# Patient Record
(demographics unavailable — no encounter records)

---

## 2024-11-20 NOTE — REASON FOR VISIT
[de-identified] : robotic left adrenalectomy  [de-identified] : 10/31/24 [de-identified] : 15 [de-identified] : 3

## 2024-11-20 NOTE — ASSESSMENT
[FreeTextEntry1] : Ms. CRUZ FAJARDO is a 46-year-old female presenting for postoperative evaluation s/p robotic left adrenalectomy 10/31/2024. She is followed by  Dr. Primitivo thomas.  S/p D&C 10/14/24. She was initially found to have a complex adrenal mass with small internal foci of fat attenuation on a CT abdomen and pelvis on 12/10/2022.   Patient s/p robotic left adrenalectomy performed 10/31/2024. Final pathology pending. She had some serosanguinous drainage at the laparoscopy site without concern.   Patient presents for postoperative evaluation. We will follow with her on the final pathology, however discussed I do not anticipate this will be a cancer. Her lap sites appear well-healing on examination and she is recovering appropriately. Will follow up on pathology. Call the office if condition changes.   After visit addendum: Pathology resulted: Adrenal gland, left, mass, adrenalectomy- Adrenal cortical adenoma with cystic changes, hemorrhage and myelolipomatous changes, findings are compatible with an adrenal cortical adenoma. Findings were relayed to the patient. Follow up PRN.

## 2024-11-20 NOTE — REASON FOR VISIT
[de-identified] : robotic left adrenalectomy  [de-identified] : 10/31/24 [de-identified] : 15 [de-identified] : 3

## 2024-11-20 NOTE — PHYSICAL EXAM
[TextEntry] : General: No acute distress. Well nourished and well kept. Head: AT/NC Eyes: PERRL. EOMI. Pulmonary: No respiratory distress. Abdomen: Soft. NT/ND. No rebound, no guarding, no rigidity. No peritoneal signs. No masses. Incision c/d/i - non-tender to palpation, no erythema or drainage at lap sites. EPigastric lap site scabbed over. Dressings removed and steristrips intact on lateral lap sites. Neurological: A&O x 4. Psychiatric: Normal affect and mood.

## 2024-11-20 NOTE — HISTORY OF PRESENT ILLNESS
[FreeTextEntry1] : Ms. CRUZ FAJARDO is a 46-year-old female presenting for postoperative follow-up s/p robotic left adrenalectomy performed 10/31/2024. She has a past medical history of diabetes mellitus, hypertension, obstructive sleep apnea on CPAP, and morbid obesity. Her surgical history is significant for a gastric bypass and an exploratory laparoscopy. She is followed by Dr. Rutledge with bariatric surgery, Dr. Primitivo thomas.  She was found to have a complex adrenal mass with small internal foci of fat attenuation on a CT abdomen and pelvis on 12/10/2022. A subsequent MRI of the abdomen was performed on 1/28/2023 which showed a solid, cystic fat containing mass within the left adrenal gland measuring up to 6.3 cm. MRI features were in favor of either a lipid rich adrenal adenoma with cystic degeneration or a lipid-poor adrenal myelolipoma with cystic degeneration.  At her initial consultation 07/07/2023 I discussed that given the size she will require a left adrenalectomy, which can likely be done minimally invasive. Will need endocrine workup prior - she established care with martha Langford 9/25/2023. She was delayed in following up with martha. On recent endo workup 7/2024, plasma metanephrines, aldosterone & renin were WNL. Cortisol on dexamethasone suppression was not completely suppressed (even on repeat testing) with low ACTH + SONALI suggesting some degree of autonomous cortisol production- likely mild autonomous cortisol secretion. She has no clinical features concerning for overt cushing's and normal salivary cortisol and 24hr urinary cortisol. Given this finding, patient may need low dose steroid replacement postoperatively. She was also evaluated by GYN underwent D&C 10/14/24 for abnormal uterine bleeding and fibroids. She was also prescribed norethindrone.  Preoperative A/P CT 08/09/2024 demonstrated the complex cystic left adrenal mass had increased in size from prior with peripheral rind of heterogeneous enhancing soft tissue, bulk fat, and curvilinear calcifications. Cystic component increased to 3.6 x 4.6 x 3.6 cm, previously 3.6 x 4.0 x 3.9 cm. Entirety lesion now measures 5.8 x 7.1 x 5.5 cm, previously 5.3 x 6.2 x 5.1 cm.  Patient s/p robotic left adrenalectomy performed 10/31/2024. Final pathology pending. She had some serosanguinous drainage at the laparoscopy site without concern.   Patient presents for postoperative evaluation. Feels well overall.

## 2024-11-20 NOTE — END OF VISIT
[FreeTextEntry3] : By signing my name below, I, Meurisergei Castro, attest that this documentation has been prepared under the direction and in the presence of Sreekanth Villanueva MD in the following sections HISTORY OF PRESENT ILLNESS; PAST MEDICAL/FAMILY/SOCIAL HISTORY; REVIEW OF SYSTEMS; PHYSICAL EXAM; ASSESSMENT/PLAN.

## 2024-11-20 NOTE — REASON FOR VISIT
[de-identified] : robotic left adrenalectomy  [de-identified] : 10/31/24 [de-identified] : 15 [de-identified] : 3

## 2024-12-13 NOTE — ASSESSMENT
[FreeTextEntry1] : #Seb derm, scalp chronic, flare -education, counseling. Diagnosis reviewed -Start ciclopirox shampoo 1-2x times a week to the scalp, leave on for 5 mins then rinse -Start cloebtasol solution daily to AA x 2 weeks, then as needed, SED, do not get on face.   #Skin tags, neck - education, counseling, reassurance - discussed cosmetic removal $150 for up to 7 oop. Patient will think about  RTC 8 weeks for scalp

## 2024-12-13 NOTE — PHYSICAL EXAM
[FreeTextEntry3] : General: well appearing person in nad, alert, pleasant Focused Skin Exam per patient preference: Scalp with no erythema, minimal scale Neck with skin colored pedunculated papules

## 2024-12-13 NOTE — HISTORY OF PRESENT ILLNESS
[FreeTextEntry1] : NPV- itchy scalp, skin tags [de-identified] : Dec 13 2024  2:00PM   46 year F new patient here for evaluation of itchy scalp, has tried many different shampoos. Feels there is dandruff when she scratches.   Also has skin tags on neck    All: NKDA No personal or family hx of skin cancer

## 2024-12-31 NOTE — HISTORY OF PRESENT ILLNESS
[FreeTextEntry1] : 46 year old female with incidental finding of L) complex adrenal mass, presents for work up of hormonal status pre-op  #Incidental L) sided adrenal lesion - now s/p resection  #Mild autonomous cortisol secretion  -Dx on MRI December 2022 -Robotic L) adrenalectomy 10/31/24 -Histology: Adrenal gland, left, mass, adrenalectomy- Adrenal cortical adenoma with cystic changes, hemorrhage and myelolipomatous changes, findings are compatible with an adrenal cortical adenoma. No further surgical follow up required.  -Patient denies symptoms of adrenal insufficiency post op  Pre op work up of hormones  2mg dex suppression not fully suppressed, but ACTH suppressed with 2mg, suggestive of ACTH independent pathology DHEAs suppressed, suggestive of chronic ACTH suppression for mild autonomous cortisol secretion Salivary cortisol wnl 24hr free cortisol < 3x ULN  Denies dizziness, headaches, hypertensive episodes Patient does report anxiety and occasional palpitations  Weight gain 60 pounds over 12 months (during COVID) No striae No proximal myopathy No thining of skin or easy bruising  Diagnosed with HTN within 12 months No BP history in family No premature IHD/CVA  Past history Fibroids HTN Lab band surgery (was planned for sleeve gastrectomy) BLAYNE - CPAP  No family history of endocrine conditions or cancer  Meds Labetalol 200mg BID Iron 65mg TID No other multivitamins No contraception

## 2024-12-31 NOTE — ASSESSMENT
[FreeTextEntry1] : 46 year old female with incidental finding of L) complex adrenal mass, presents for work up of hormonal status pre-op  #L) adrenal incidental mass s/p resection - benign adrenal adenoma  #MAC -Check AM cortisol and ACTH to ensure no evidence of adrenal insufficiency  -If normal. check 2mg dex suppression test to see if MAC resolved post surgery   #HTN -BP relatively controlled on losartan 200mg BID  Review in

## 2025-01-21 NOTE — HISTORY OF PRESENT ILLNESS
[de-identified] : Juliana is a 45 y/o with a history of lap band Surgery on  06/09/2010 present today for reconsult for possible conversion to sleeve.

## 2025-01-21 NOTE — HISTORY OF PRESENT ILLNESS
[de-identified] : Juliana is a 47 y/o with a history of lap band Surgery on  06/09/2010 present today for reconsult for possible conversion to sleeve.

## 2025-05-22 NOTE — HISTORY OF PRESENT ILLNESS
[de-identified] : Juliana is a 45 y/o with a history of lap band Surgery on  06/09/2010 present today for reconsult for possible conversion to sleeve.

## 2025-06-06 NOTE — ASSESSMENT
[FreeTextEntry1] : 47-year-old female 5 foot 3 260 pounds with a BMI of 45.4 she has had a lap band for some time and has been having difficulty and would like it out she would like to be converted to a sleeve gastrectomy.  The risk benefits and expectations of the procedure including two-stage procedure were discussed with the patient all of her questions were answered.  She will complete her upper GI and endoscopy we will take out her Lap-Band and then she will complete her workup for the sleeve gastrectomy and have that performed at a later date all of her questions were answered.  The risk benefits expectations of the procedure were discussed with the patient at length these include but are not limited to bleeding infection staple line leaks obstruction postoperative reflux and weight regain.  Other options were also discussed with the patient such as gastric bypass.  All of the patient's questions were answered.
[FreeTextEntry1] : 47-year-old female 5 foot 3 260 pounds with a BMI of 45.4 she has had a lap band for some time and has been having difficulty and would like it out she would like to be converted to a sleeve gastrectomy.  The risk benefits and expectations of the procedure including two-stage procedure were discussed with the patient all of her questions were answered.  She will complete her upper GI and endoscopy we will take out her Lap-Band and then she will complete her workup for the sleeve gastrectomy and have that performed at a later date all of her questions were answered.  The risk benefits expectations of the procedure were discussed with the patient at length these include but are not limited to bleeding infection staple line leaks obstruction postoperative reflux and weight regain.  Other options were also discussed with the patient such as gastric bypass.  All of the patient's questions were answered.
109

## 2025-06-06 NOTE — HISTORY OF PRESENT ILLNESS
[de-identified] : Juliana is a 45 y/o with a history of lap band Surgery on  06/09/2010 present today for reconsult for possible conversion to sleeve.  Today pt reports feeling no pain.  Formed BMs once every few days.  Good appetite.  No c/o nausea or vomiting.  Denies fever and chills.  No acid reflux.

## 2025-06-06 NOTE — HISTORY OF PRESENT ILLNESS
[de-identified] : Juliana is a 45 y/o with a history of lap band Surgery on  06/09/2010 present today for reconsult for possible conversion to sleeve.  Today pt reports feeling no pain.  Formed BMs once every few days.  Good appetite.  No c/o nausea or vomiting.  Denies fever and chills.  No acid reflux.